# Patient Record
(demographics unavailable — no encounter records)

---

## 2025-01-08 NOTE — END OF VISIT
[FreeTextEntry3] :   I, Michelle Wong MD, personally performed the evaluation and management (E/M) services for this patient. That E/M includes conducting the clinically appropriate initial history &/or exam, assessing all conditions, and establishing the plan of care. I have also independently reviewed the medical records and imaging at the time of this office visit, and discussed the above mentioned images with interpretations with the patient. Today, OMID Pickering was here to participate in the visit & follow plan of care established by me.

## 2025-01-08 NOTE — ASSESSMENT
[FreeTextEntry1] : Reviewed: PFT 1/8/2025: FEV1 92, FEV1/FVC 84, TLC 83, DLCO 86 6MWT 1/8/2025: 97% on RA, no desaturation after 518 meters  CT chest 8/28/2024: Pectus excavatum with calculated Vinicio index of 3.4, no significant parenchymal abnormality  The patient is a 33-year-old M without significant PMHx, referred by his PCP Dr. Hamlet Loera to discuss decreased exercise tolerance and pectus excavatum. Has noticed a decreased tolerance for strenuous activity lifelong. CT chest done this summer demonstrates moderate pectus excavatum. PFTs today with normal spirometry, lung volumes and diffusion capacity. Will need TTE to r/o valvular abnormalities given the association with pectus excavatum. Discussed surgical correction of pectus excavatum and possible referral to CT surgery for this moving forward. Surgical correction not usually indicated for adults given that risks likely outweigh benefits as there is a varied success rate.   F/u after TTE.

## 2025-01-08 NOTE — CONSULT LETTER
[Dear  ___] : Dear  [unfilled], [Consult Letter:] : I had the pleasure of evaluating your patient, [unfilled]. [Please see my note below.] : Please see my note below. [Consult Closing:] : Thank you very much for allowing me to participate in the care of this patient.  If you have any questions, please do not hesitate to contact me. [Sincerely,] : Sincerely, [FreeTextEntry3] : Michelle Wong MD  [employer] at [facility] Pulmonary, Critical Care, and Sleep Medicine NPI [id number]

## 2025-01-08 NOTE — HISTORY OF PRESENT ILLNESS
[Never] : never [TextBox_4] : The patient is a 33-year-old M without significant PMHx, referred by his PCP Dr. Hamlet Loera to discuss decreased exercise tolerance and pectus excavatum. Since he was young has noticed decreased tolerance for strenuous exercise. No hx of lung disease or known family hx of lung disease or pectus excavatum. Otherwise, no pulmonary complaints. Saw an allergist recently and told he has allergies to dust mites and dogs, has some eye itchiness. Told on exams before there was abnormal sound in left lung field.  [ESS] : 1

## 2025-01-21 NOTE — CONSULT LETTER
[Dear  ___] : Dear  [unfilled], [Courtesy Letter:] : I had the pleasure of seeing your patient, [unfilled], in my office today. [Please see my note below.] : Please see my note below. [Consult Closing:] : Thank you very much for allowing me to participate in the care of this patient.  If you have any questions, please do not hesitate to contact me. [Sincerely,] : Sincerely, [FreeTextEntry3] : Michelle Wong MD  [employer] at [facility] Pulmonary, Critical Care, and Sleep Medicine

## 2025-01-21 NOTE — ASSESSMENT
[FreeTextEntry1] : Reviewed: PFT 1/8/2025: FEV1 92, FEV1/FVC 84, TLC 83, DLCO 86 6MWT 1/8/2025: 97% on RA, no desaturation after 518 meters CT chest 8/28/2024: Pectus excavatum with calculated Vinicio index of 3.4, no significant parenchymal abnormality TTE 1/14/2025: no pathology  The patient is a 33-year-old M without significant PMHx, referred by his PCP Dr. Hamlet Loera to discuss decreased exercise tolerance and pectus excavatum. Has noticed a decreased tolerance for strenuous activity lifelong. CT chest done this summer demonstrates moderate pectus excavatum. PFTs overall with normal spirometry, lung volumes and diffusion capacity. VC on lower end of normal which may explain decreased ET. TTE without pathology, no valvular abnormalities. Discussed surgical correction of pectus excavatum and possible referral to CT surgery for this moving forward. Surgical correction not usually indicated for adults given that risks likely outweigh benefits as there is a varied success rate so will hold off for now.   F/u yearly or sooner if needed.

## 2025-01-21 NOTE — REASON FOR VISIT
[Home] : at home, [unfilled] , at the time of the visit. [Medical Office: ([facility])___] : at the medical office located in  [Patient] : the patient [Follow-Up] : a follow-up visit [Self] : self [TextBox_44] : SOB, pectus excavatum

## 2025-01-21 NOTE — HISTORY OF PRESENT ILLNESS
[Never] : never [TextBox_4] : The patient is a 33-year-old M without significant PMHx, referred by his PCP Dr. Hamlet Loera to discuss decreased exercise tolerance and pectus excavatum. Since he was young has noticed decreased tolerance for strenuous exercise. No hx of lung disease or known family hx of lung disease or pectus excavatum. Otherwise, no pulmonary complaints. Saw an allergist recently and told he has allergies to dust mites and dogs, has some eye itchiness. Told on exams before there was abnormal sound in left lung field.    1/21/2025 Had TTE and PFTs, here to discuss results. [ESS] : 1